# Patient Record
Sex: FEMALE | URBAN - METROPOLITAN AREA
[De-identification: names, ages, dates, MRNs, and addresses within clinical notes are randomized per-mention and may not be internally consistent; named-entity substitution may affect disease eponyms.]

---

## 2020-10-08 ENCOUNTER — APPOINTMENT (RX ONLY)
Dept: URBAN - METROPOLITAN AREA CLINIC 48 | Facility: CLINIC | Age: 20
Setting detail: DERMATOLOGY
End: 2020-10-08

## 2020-10-08 DIAGNOSIS — Z41.9 ENCOUNTER FOR PROCEDURE FOR PURPOSES OTHER THAN REMEDYING HEALTH STATE, UNSPECIFIED: ICD-10-CM

## 2020-10-08 PROCEDURE — ? MEDICAL CONSULTATION: LHR

## 2020-11-19 ENCOUNTER — APPOINTMENT (RX ONLY)
Dept: URBAN - METROPOLITAN AREA CLINIC 48 | Facility: CLINIC | Age: 20
Setting detail: DERMATOLOGY
End: 2020-11-19

## 2020-11-19 DIAGNOSIS — Z41.9 ENCOUNTER FOR PROCEDURE FOR PURPOSES OTHER THAN REMEDYING HEALTH STATE, UNSPECIFIED: ICD-10-CM

## 2020-11-19 PROCEDURE — ? LASER HAIR REMOVAL

## 2020-11-19 NOTE — HPI: COSMETIC (LASER HAIR REMOVAL)
Eye Color: Amena Han
Have You Had Laser Hair Removal Before?: has not had previous treatment
When Outside In The Sun, Do You...: rarely burns, tans with ease

## 2020-11-19 NOTE — PROCEDURE: LASER HAIR REMOVAL
Total Pulses: [address]
Tolerated Procedure (Optional): Tolerated Well
Laser Type: Desire Diode 805nm
Detail Level: Detailed
Cooling: chill tip
External Cooling Fan Speed: 0
Shaving (Optional): The patient shaved at home
Post-Care Instructions: I reviewed with the patient in detail post-care instructions. Patient should avoid sun for a minimum of 4 weeks before and after treatment.
Treatment Number: 1
Fluence (Will Not Render If 0): [address] [facility], [place], operated by [facility]
Render Post-Care In The Note: No
Pre-Procedure: Prior to proceeding the treatment areas were cleaned and all present put on their eye protection.
Fluence (Will Not Render If 0): 20
Were Eye Shields Employed?: Yes
Eye Shield Text: Given the treatment area eye shields were inserted prior to treatment.
Pulse Duration: 100 ms
Consent: Written consent obtained, risks reviewed including but not limited to crusting, scabbing, blistering, scarring, darker or lighter pigmentary change, paradoxical hair regrowth, incomplete removal of hair and infection.
Post-Procedure Care: Immediate endpoint: perifollicular erythema and edema. Hydrocortisone cream 2.5% appliedPost care reviewed with patient.
Fluence (Will Not Render If 0): [address]

## 2020-12-18 ENCOUNTER — APPOINTMENT (RX ONLY)
Dept: URBAN - METROPOLITAN AREA CLINIC 48 | Facility: CLINIC | Age: 20
Setting detail: DERMATOLOGY
End: 2020-12-18

## 2020-12-18 DIAGNOSIS — Z41.9 ENCOUNTER FOR PROCEDURE FOR PURPOSES OTHER THAN REMEDYING HEALTH STATE, UNSPECIFIED: ICD-10-CM

## 2020-12-18 PROCEDURE — ? LASER HAIR REMOVAL

## 2020-12-18 NOTE — PROCEDURE: LASER HAIR REMOVAL
Fluence (Will Not Render If 0): [address] [facility]
Pre-Procedure: Prior to proceeding the treatment areas were cleaned and all present put on their eye protection.
Treatment Number: 0
Fluence (Will Not Render If 0): 20
Pulse Duration: 100 ms
Render Post-Care In The Note: No
Were Eye Shields Employed?: Yes
Shaving (Optional): The patient shaved at home
Post-Care Instructions: I reviewed with the patient in detail post-care instructions. Patient should avoid sun for a minimum of 4 weeks before and after treatment.
Tolerated Procedure (Optional): Tolerated Well
Detail Level: Detailed
Total Pulses: [address]
Laser Type: Desire Diode 805nm
Fluence (Will Not Render If 0): [address] [facility]
Consent: Written consent obtained, risks reviewed including but not limited to crusting, scabbing, blistering, scarring, darker or lighter pigmentary change, paradoxical hair regrowth, incomplete removal of hair and infection.
Post-Procedure Care: Immediate endpoint: perifollicular erythema and edema. Hydrocortisone cream 2.5% applied. Post care reviewed with patient.
Eye Shield Text: Given the treatment area eye shields were inserted prior to treatment.
Cooling: chill tip

## 2020-12-18 NOTE — HPI: COSMETIC (LASER HAIR REMOVAL)
Eye Color: Leonidas Rae
Have You Had Laser Hair Removal Before?: has had previous treatment
Number Of Treatments: 1

## 2021-01-28 ENCOUNTER — APPOINTMENT (RX ONLY)
Dept: URBAN - METROPOLITAN AREA CLINIC 48 | Facility: CLINIC | Age: 21
Setting detail: DERMATOLOGY
End: 2021-01-28

## 2021-01-28 DIAGNOSIS — Z41.9 ENCOUNTER FOR PROCEDURE FOR PURPOSES OTHER THAN REMEDYING HEALTH STATE, UNSPECIFIED: ICD-10-CM

## 2021-01-28 PROCEDURE — ? LASER HAIR REMOVAL

## 2021-01-28 NOTE — PROCEDURE: LASER HAIR REMOVAL
Treatment Number: 0
Pulse Duration: 100 ms
Pre-Procedure: Prior to proceeding the treatment areas were cleaned and all present put on their eye protection.
Fluence (Will Not Render If 0): [address] [facility]
Post-Procedure Care: Immediate endpoint: perifollicular erythema and edema. Hydrocortisone cream 2.5% applied. Post care reviewed with patient.
Fluence (Will Not Render If 0): [address] [address]
Fluence (Will Not Render If 0): 20
Consent: Written consent obtained, risks reviewed including but not limited to crusting, scabbing, blistering, scarring, darker or lighter pigmentary change, paradoxical hair regrowth, incomplete removal of hair and infection.
Eye Shield Text: Given the treatment area eye shields were inserted prior to treatment.
Cooling: chill tip
Shaving (Optional): The patient shaved at home
Post-Care Instructions: I reviewed with the patient in detail post-care instructions. Patient should avoid sun for a minimum of 4 weeks before and after treatment.
Total Pulses: [address] [facility]
Tolerated Procedure (Optional): Tolerated Well
Detail Level: Detailed
Laser Type: Desire Diode 805nm
Render Post-Care In The Note: No
Were Eye Shields Employed?: Yes

## 2021-03-04 ENCOUNTER — APPOINTMENT (RX ONLY)
Dept: URBAN - METROPOLITAN AREA CLINIC 48 | Facility: CLINIC | Age: 21
Setting detail: DERMATOLOGY
End: 2021-03-04

## 2021-03-04 DIAGNOSIS — Z41.9 ENCOUNTER FOR PROCEDURE FOR PURPOSES OTHER THAN REMEDYING HEALTH STATE, UNSPECIFIED: ICD-10-CM

## 2021-03-04 PROCEDURE — ? LASER HAIR REMOVAL

## 2021-03-04 NOTE — HPI: COSMETIC (LASER HAIR REMOVAL)
Eye Color: Lashonda Chavarria
Have You Had Laser Hair Removal Before?: has had previous treatment
When Outside In The Sun, Do You...: rarely burns, tans with ease

## 2021-03-04 NOTE — PROCEDURE: LASER HAIR REMOVAL
Number Of Prepaid Treatments (Will Not Render If 0): 0
Detail Level: Detailed
Render Post-Care In The Note: No
Tolerated Procedure (Optional): Tolerated Well
Total Pulses: [address] [facility]
Laser Type: Desire Diode 805nm
Were Eye Shields Employed?: Yes
Fluence (Will Not Render If 0): [address] [facility]
Fluence (Will Not Render If 0): [address] [facility]
Post-Procedure Care: Immediate endpoint: perifollicular erythema and edema. Hydrocortisone cream 2.5% appliedPost care reviewed with patient.
Pre-Procedure: Prior to proceeding the treatment areas were cleaned and all present put on their eye protection.
Fluence (Will Not Render If 0): 20
Pulse Duration: 100 ms
Cooling: chill tip
Post-Care Instructions: I reviewed with the patient in detail post-care instructions. Patient should avoid sun for a minimum of 4 weeks before and after treatment.
Consent: Written consent obtained, risks reviewed including but not limited to crusting, scabbing, blistering, scarring, darker or lighter pigmentary change, paradoxical hair regrowth, incomplete removal of hair and infection.
Eye Shield Text: Given the treatment area eye protective goggles applied prior to treatment.
Shaving (Optional): The patient shaved at home

## 2021-04-01 ENCOUNTER — APPOINTMENT (RX ONLY)
Dept: URBAN - METROPOLITAN AREA CLINIC 48 | Facility: CLINIC | Age: 21
Setting detail: DERMATOLOGY
End: 2021-04-01

## 2021-04-01 DIAGNOSIS — Z41.9 ENCOUNTER FOR PROCEDURE FOR PURPOSES OTHER THAN REMEDYING HEALTH STATE, UNSPECIFIED: ICD-10-CM

## 2021-04-01 PROCEDURE — ? LASER HAIR REMOVAL

## 2021-04-01 NOTE — PROCEDURE: LASER HAIR REMOVAL
Number Of Prepaid Treatments (Will Not Render If 0): 0
Laser Type: Desire Diode 805nm
Fluence (Will Not Render If 0): [address] [facility]
Cooling: chill tip
Tolerated Procedure (Optional): Tolerated Well
Total Pulses: [address] [facility]
Fluence (Will Not Render If 0): 20
Post-Care Instructions: I reviewed with the patient in detail post-care instructions. Patient should avoid sun for a minimum of 4 weeks before and after treatment.
Comments: Pt tolerated well. Consent signed by pt and saved for this visit. Consent not popping up afterwards. Glitch?
Consent: Written consent obtained, risks reviewed including but not limited to crusting, scabbing, blistering, scarring, darker or lighter pigmentary change, paradoxical hair regrowth, incomplete removal of hair and infection.
Were Eye Shields Employed?: Yes
Pulse Duration: 100 ms
Render Post-Care In The Note: No
Pre-Procedure: Prior to proceeding the treatment areas were cleaned and all present put on their eye protection.
Shaving (Optional): The patient shaved at home
Detail Level: Detailed
Fluence (Will Not Render If 0): [address] [facility]
Eye Shield Text: Given the treatment area eye shields were given prior to treatment.
Post-Procedure Care: Immediate endpoint: perifollicular erythema and edema. Vaseline and ice applied. Post care reviewed with patient.

## 2021-04-01 NOTE — HPI: COSMETIC (LASER HAIR REMOVAL)
Eye Color: Berny Pate
Have You Had Laser Hair Removal Before?: has had previous treatment
When Outside In The Sun, Do You...: mildly burns, tans slowly
When Was Your Last Laser Treatment?: 03/04/2021

## 2021-04-29 ENCOUNTER — APPOINTMENT (RX ONLY)
Dept: URBAN - METROPOLITAN AREA CLINIC 48 | Facility: CLINIC | Age: 21
Setting detail: DERMATOLOGY
End: 2021-04-29

## 2021-04-29 DIAGNOSIS — Z41.9 ENCOUNTER FOR PROCEDURE FOR PURPOSES OTHER THAN REMEDYING HEALTH STATE, UNSPECIFIED: ICD-10-CM

## 2021-04-29 PROCEDURE — ? LASER HAIR REMOVAL

## 2021-04-29 NOTE — PROCEDURE: LASER HAIR REMOVAL
External Cooling Fan Speed: 0
Eye Shield Text: Given the treatment area eye shields were inserted prior to treatment.
Post-Procedure Care: Immediate endpoint: perifollicular erythema and edema. Hydrocortisone cream 2.5% applied Post care reviewed with patient.  Recommended touch up in 6~12 months depending on hair growth
Consent: Written consent obtained, risks reviewed including but not limited to crusting, scabbing, blistering, scarring, darker or lighter pigmentary change, paradoxical hair regrowth, incomplete removal of hair and infection.
Fluence (Will Not Render If 0): [address] [facility]
Fluence (Will Not Render If 0): [address]
Tolerated Procedure (Optional): Tolerated Well
Detail Level: Detailed
Laser Type: Desire Diode 805nm
Post-Care Instructions: I reviewed with the patient in detail post-care instructions. Patient should avoid sun for a minimum of 4 weeks before and after treatment.
Cooling: chill tip
Total Pulses: [address] [facility] [address]
Were Eye Shields Employed?: Yes
Comments: Pt states she did not even have to shave prior to todayâs treatment
Render Post-Care In The Note: No
Pre-Procedure: Prior to proceeding the treatment areas were cleaned and all present put on their eye protection.
Pulse Duration: 30 ms
Fluence (Will Not Render If 0): 20

## 2021-04-29 NOTE — HPI: COSMETIC (LASER HAIR REMOVAL)
Eye Color: Meera Most
Have You Had Laser Hair Removal Before?: has had previous treatment
When Outside In The Sun, Do You...: rarely burns, tans with ease